# Patient Record
Sex: MALE | Race: WHITE | URBAN - METROPOLITAN AREA
[De-identification: names, ages, dates, MRNs, and addresses within clinical notes are randomized per-mention and may not be internally consistent; named-entity substitution may affect disease eponyms.]

---

## 2017-12-21 ENCOUNTER — TRANSFERRED RECORDS (OUTPATIENT)
Dept: HEALTH INFORMATION MANAGEMENT | Facility: CLINIC | Age: 47
End: 2017-12-21

## 2018-01-01 ENCOUNTER — MEDICAL CORRESPONDENCE (OUTPATIENT)
Dept: HEALTH INFORMATION MANAGEMENT | Facility: CLINIC | Age: 48
End: 2018-01-01

## 2018-02-12 NOTE — TELEPHONE ENCOUNTER
APPT INFO    Date /Time: 3/5/18 1:10PM   Reason for Appt: trigeminal neuralgia    Ref Provider/Clinic: Self referred listed   Are there internal records? Yes/No?  IF YES, list clinic names: NO   Are there outside records? Yes/No? YES - see below   Patient Contact (Y/N) & Call Details: NO - referral received   Action: Reviewed records      OUTSIDE RECORDS CHECKLIST     CLINIC NAME COMMENTS REC (x) IMG (x)   [facility] Fax #: [phone] x x           Records Received From:  [facility]     DATE/EXAM/LOCATION  (specify location if different)   Office Notes: 12/21/17, 11/27/17, 9/21/17, 8/28/17, 6/26/17   Labs: 2017   Radiology Reports: - CT sinuses 9/3/15  - CT head 2/24/16

## 2018-02-21 NOTE — TELEPHONE ENCOUNTER
Received Imaging From: [facility]     Image Type (x): Disc:___  Pacs:__x_      Exam Date/Name: - CT sinuses 9/3/15  - CT head 2/24/16 Comments: images are in PACS

## 2018-03-05 ENCOUNTER — PRE VISIT (OUTPATIENT)
Dept: ANESTHESIOLOGY | Facility: CLINIC | Age: 48
End: 2018-03-05

## 2018-03-12 ENCOUNTER — OFFICE VISIT (OUTPATIENT)
Dept: ANESTHESIOLOGY | Facility: CLINIC | Age: 48
End: 2018-03-12
Payer: COMMERCIAL

## 2018-03-12 VITALS
BODY MASS INDEX: 40.43 KG/M2 | HEART RATE: 86 BPM | WEIGHT: 315 LBS | DIASTOLIC BLOOD PRESSURE: 96 MMHG | HEIGHT: 74 IN | SYSTOLIC BLOOD PRESSURE: 137 MMHG | RESPIRATION RATE: 16 BRPM

## 2018-03-12 DIAGNOSIS — G50.0 TRIGEMINAL NEURALGIA: Primary | ICD-10-CM

## 2018-03-12 RX ORDER — LISINOPRIL 5 MG/1
5 TABLET ORAL
COMMUNITY
Start: 2017-05-12 | End: 2018-05-12

## 2018-03-12 RX ORDER — METOCLOPRAMIDE 10 MG/1
10 TABLET ORAL
COMMUNITY
Start: 2015-09-25

## 2018-03-12 RX ORDER — TIZANIDINE 2 MG/1
2 TABLET ORAL
COMMUNITY
Start: 2016-06-01

## 2018-03-12 RX ORDER — HYDROMORPHONE HYDROCHLORIDE 2 MG/1
2 TABLET ORAL
COMMUNITY
Start: 2016-06-01

## 2018-03-12 RX ORDER — LANOLIN ALCOHOL/MO/W.PET/CERES
1000 CREAM (GRAM) TOPICAL
COMMUNITY
Start: 2015-10-13

## 2018-03-12 RX ORDER — VERAPAMIL HYDROCHLORIDE 240 MG/1
CAPSULE, EXTENDED RELEASE ORAL
COMMUNITY
Start: 2016-10-17

## 2018-03-12 ASSESSMENT — ENCOUNTER SYMPTOMS
NUMBNESS: 1
DECREASED APPETITE: 0
WEIGHT GAIN: 1
ALTERED TEMPERATURE REGULATION: 0
SEIZURES: 0
WEIGHT LOSS: 0
CHILLS: 0
TINGLING: 1
HALLUCINATIONS: 0
POLYDIPSIA: 0
NERVOUS/ANXIOUS: 1
HEADACHES: 0
WEAKNESS: 0
INCREASED ENERGY: 0
MEMORY LOSS: 1
DECREASED CONCENTRATION: 1
DEPRESSION: 0
POLYPHAGIA: 0
DISTURBANCES IN COORDINATION: 0
PARALYSIS: 0
PANIC: 1
FEVER: 0
INSOMNIA: 1
LOSS OF CONSCIOUSNESS: 0
TREMORS: 0
FATIGUE: 0
NIGHT SWEATS: 0
DIZZINESS: 0
SPEECH CHANGE: 0

## 2018-03-12 ASSESSMENT — ANXIETY QUESTIONNAIRES
4. TROUBLE RELAXING: NEARLY EVERY DAY
7. FEELING AFRAID AS IF SOMETHING AWFUL MIGHT HAPPEN: SEVERAL DAYS
3. WORRYING TOO MUCH ABOUT DIFFERENT THINGS: MORE THAN HALF THE DAYS
5. BEING SO RESTLESS THAT IT IS HARD TO SIT STILL: SEVERAL DAYS
GAD7 TOTAL SCORE: 10
7. FEELING AFRAID AS IF SOMETHING AWFUL MIGHT HAPPEN: SEVERAL DAYS
GAD7 TOTAL SCORE: 10
2. NOT BEING ABLE TO STOP OR CONTROL WORRYING: SEVERAL DAYS
GAD7 TOTAL SCORE: 10
6. BECOMING EASILY ANNOYED OR IRRITABLE: SEVERAL DAYS
1. FEELING NERVOUS, ANXIOUS, OR ON EDGE: SEVERAL DAYS

## 2018-03-12 ASSESSMENT — PAIN SCALES - GENERAL: PAINLEVEL: SEVERE PAIN (6)

## 2018-03-12 ASSESSMENT — PATIENT HEALTH QUESTIONNAIRE - PHQ9
10. IF YOU CHECKED OFF ANY PROBLEMS, HOW DIFFICULT HAVE THESE PROBLEMS MADE IT FOR YOU TO DO YOUR WORK, TAKE CARE OF THINGS AT HOME, OR GET ALONG WITH OTHER PEOPLE: EXTREMELY DIFFICULT
SUM OF ALL RESPONSES TO PHQ QUESTIONS 1-9: 11
SUM OF ALL RESPONSES TO PHQ QUESTIONS 1-9: 11

## 2018-03-12 NOTE — LETTER
"3/12/2018       RE: Kang Villeda  [address] [place] [address]     Dear Colleague,    Thank you for referring your patient, Kang Villeda, to the [facility] FOR COMPRE[facility]ENS[facility]NAGEMENT at [facility]. Please see a copy of my visit note below.    I had the pleasure of meeting Mr. Kang Villeda on 3/12/2018 in the outpatient pain clinic in consult for Marly Frausto NP with regards to his trigeminal neuralgia.  HPI:  Patient presents with past medical history of trigeminal neuralgia; he is here today for evaluation of chronic pain of left orbital area, secondary to trigeminal neuralgia. Patient reports onset of symptoms on November 6, 2013 without inciting event.  He states he walked outside, as he was leaving for work, and felt sensation of \"shards of ice on his face\".  These symptoms were persistent until patient underwent two gamma knife surgeries that did eventually resolve his jaw pain.  Prior to gamma knife surgeries, he underwent trigeminal nerve blocks that were not effective.  His only persistent symptom is now his left orbital pain which he describes as \"a brain freeze\"-sensation of pain.  Reportedly, patient has seen several specialists for this remaining neuralgia and all have been unwilling to perform an intervention due to fear of causing blindness-per patient.  Patient was referred here today from his neurology department in [facility] where he was receiving hydromorphone infusions at the infusion center under standing orders.  He admits today that he was likely abusing this intervention, stating he would attend infusion center daily on average.  He was referred here today specifically for consideration of ketamine infusions, which patient states he has obtained insurance approval for.     Of note, patient's current medication regimen for pain consists of hydromorphone 4 mg, 6 tablets/day, tizanidine 2 mg, verapamil 240 mg twice " daily, Reglan 10 mg.  ?  History reviewed. No pertinent past medical history. past medical history reviewed with patient.   History reviewed. No pertinent surgical history. past surgical history reviewed with patient.   Medications:  Current Outpatient Prescriptions   Medication     metoclopramide (REGLAN) 10 MG tablet     tiZANidine (ZANAFLEX) 2 MG tablet     verapamil (VERELAN) 240 MG CP24 24 hr capsule     lisinopril (PRINIVIL/ZESTRIL) 5 MG tablet     HYDROmorphone (DILAUDID) 2 MG tablet     Calcium Carb-Cholecalciferol (OYSTER SHELL CALCIUM) 500-400 MG-UNIT TABS     Cholecalciferol (VITAMIN D3) 1000 UNITS CAPS     Ferrous Sulfate (IRON) 28 MG TABS     cyanocobalamin (VITAMIN  B-12) 1000 MCG tablet     No current facility-administered medications for this visit.      A multi-state Prescription Monitoring Program reviewed and no aberrancies noted.     Allergies:     Allergies   Allergen Reactions     Clindamycin Anaphylaxis and Hives     Other reaction(s): TACHYCARDIA     Doxycycline Hives and Rash     Other reaction(s): TACHYCARDIA     Gabapentin      Other reaction(s): DELIRIUM, TACHYCARDIA     Hydrocodone-Acetaminophen      Other reaction(s): HEADACHE     Nitrofurantoin Hives     Other reaction(s): OTHER     Ondansetron      Other reaction(s): DIZZINESS  Other reaction(s): EDEMA     Pregabalin      Other reaction(s): DELIRIUM, HALLUCINATIONS     Sumatriptan      Other reaction(s): RESPIRATORY DISTRESS     Amoxicillin Hives and Other (See Comments)     Haloperidol Other (See Comments)     Dystonic reaction     Penicillins      Other reaction(s): UNKNOWN  childhood     Phenytoin      Other reaction(s): Other  Patient reported allergy to PHEYTOIN, given in ER.   Ears ringing, dizziness, head pressure.     Family History:  family history is not on file.  Social history: he lives in [place]. he is currently working and is on disability.   Smoking: never smoker. Alcohol: denies. Street drugs: denies.  "    ROS:  A 14 point review of systems was completed; results are listed at end of note.     Objective:   BP (!) 137/96  Pulse 86  Resp 16  Ht 1.88 m (6' 2\")  Wt (!) 195 kg (430 lb)  BMI 55.21 kg/m2  Body mass index is 55.21 kg/(m^2).  General: In no apparent distress, obese  Mental status: Depressed mood, Normal affect, pleasant  Neuro: Alert, oriented. No sensory deficits.   Head: Atraumatic, normocephalic  Eyes: Extra-ocular movements grossly intact, no scleral icterus. No tenderness of frontal lobe; mild allodynia of left lid.   Neck: Supple, Range of motion full  Respiratory: Non-labored breathing; No respiratory distress  Skin: No rashes or lesions noted on exposed areas of skin    Assessment:  Mr. Kang Villeda is a 47 yo male seen today for trigeminal neuralgia with persistent left T1 neuralgia.     Plan:   1. Patient education: I went over the above diagnoses and treatment plan with him and answered all of his questions.  2. Interventions:  -Discussed with patient that a left supraorbital nerve block vs V1 block may provide more targeted treatment for his pain; I will consult his case with my colleagues to determine if this is a reasonable intervention to pursue. We may also consider referral to our cranio-facial pain specialist. Otherwise, ketamine infusions may benefit patient's pain that is secondary to trigeminal neuralgia and is reasonable to consider if analgesia cannot be obtained with block. Will call patient within the week for plan of care.    3. Follow up: Return in 4-6 weeks or as needed.     Total time spent was 30 minutes, and more than 50% of face to face time was spent in counseling and/or coordination of care regarding the above plan.    Thank you for the consult.   ARLETTE Dorado, [facility]  Department of Anesthesiology  "

## 2018-03-12 NOTE — PROGRESS NOTES
"I had the pleasure of meeting Mr. Kang Villeda on 3/12/2018 in the outpatient pain clinic in consult for Marly Frausto NP with regards to his trigeminal neuralgia.  HPI:  Patient presents with past medical history of trigeminal neuralgia; he is here today for evaluation of chronic pain of left orbital area, secondary to trigeminal neuralgia. Patient reports onset of symptoms on November 6, 2013 without inciting event.  He states he walked outside, as he was leaving for work, and felt sensation of \"shards of ice on his face\".  These symptoms were persistent until patient underwent two gamma knife surgeries that did eventually resolve his jaw pain.  Prior to gamma knife surgeries, he underwent trigeminal nerve blocks that were not effective.  His only persistent symptom is now his left orbital pain which he describes as \"a brain freeze\"-sensation of pain.  Reportedly, patient has seen several specialists for this remaining neuralgia and all have been unwilling to perform an intervention due to fear of causing blindness-per patient.  Patient was referred here today from his neurology department in [facility] where he was receiving hydromorphone infusions at the infusion center under standing orders.  He admits today that he was likely abusing this intervention, stating he would attend infusion center daily on average.  He was referred here today specifically for consideration of ketamine infusions, which patient states he has obtained insurance approval for.     Of note, patient's current medication regimen for pain consists of hydromorphone 4 mg, 6 tablets/day, tizanidine 2 mg, verapamil 240 mg twice daily, Reglan 10 mg.  ?  History reviewed. No pertinent past medical history. past medical history reviewed with patient.   History reviewed. No pertinent surgical history. past surgical history reviewed with patient.   Medications:  Current Outpatient Prescriptions   Medication     metoclopramide (REGLAN) 10 " "MG tablet     tiZANidine (ZANAFLEX) 2 MG tablet     verapamil (VERELAN) 240 MG CP24 24 hr capsule     lisinopril (PRINIVIL/ZESTRIL) 5 MG tablet     HYDROmorphone (DILAUDID) 2 MG tablet     Calcium Carb-Cholecalciferol (OYSTER SHELL CALCIUM) 500-400 MG-UNIT TABS     Cholecalciferol (VITAMIN D3) 1000 UNITS CAPS     Ferrous Sulfate (IRON) 28 MG TABS     cyanocobalamin (VITAMIN  B-12) 1000 MCG tablet     No current facility-administered medications for this visit.      A multi-state Prescription Monitoring Program reviewed and no aberrancies noted.     Allergies:     Allergies   Allergen Reactions     Clindamycin Anaphylaxis and Hives     Other reaction(s): TACHYCARDIA     Doxycycline Hives and Rash     Other reaction(s): TACHYCARDIA     Gabapentin      Other reaction(s): DELIRIUM, TACHYCARDIA     Hydrocodone-Acetaminophen      Other reaction(s): HEADACHE     Nitrofurantoin Hives     Other reaction(s): OTHER     Ondansetron      Other reaction(s): DIZZINESS  Other reaction(s): EDEMA     Pregabalin      Other reaction(s): DELIRIUM, HALLUCINATIONS     Sumatriptan      Other reaction(s): RESPIRATORY DISTRESS     Amoxicillin Hives and Other (See Comments)     Haloperidol Other (See Comments)     Dystonic reaction     Penicillins      Other reaction(s): UNKNOWN  childhood     Phenytoin      Other reaction(s): Other  Patient reported allergy to PHEYTOIN, given in ER.   Ears ringing, dizziness, head pressure.     Family History:  family history is not on file.  Social history: he lives in [place]. he is currently working and is on disability.   Smoking: never smoker. Alcohol: denies. Street drugs: denies.     ROS:  A 14 point review of systems was completed; results are listed at end of note.     Objective:   BP (!) 137/96  Pulse 86  Resp 16  Ht 1.88 m (6' 2\")  Wt (!) 195 kg (430 lb)  BMI 55.21 kg/m2  Body mass index is 55.21 kg/(m^2).  General: In no apparent distress, obese  Mental status: Depressed mood, " Normal affect, pleasant  Neuro: Alert, oriented. No sensory deficits.   Head: Atraumatic, normocephalic  Eyes: Extra-ocular movements grossly intact, no scleral icterus. No tenderness of frontal lobe; mild allodynia of left lid.   Neck: Supple, Range of motion full  Respiratory: Non-labored breathing; No respiratory distress  Skin: No rashes or lesions noted on exposed areas of skin        Assessment:  Mr. Kang Villeda is a 47 yo male seen today for trigeminal neuralgia with persistent left T1 neuralgia.     Plan:   1. Patient education: I went over the above diagnoses and treatment plan with him and answered all of his questions.  2. Interventions:  -Discussed with patient that a left supraorbital nerve block vs V1 block may provide more targeted treatment for his pain; I will consult his case with my colleagues to determine if this is a reasonable intervention to pursue. We may also consider referral to our cranio-facial pain specialist. Otherwise, ketamine infusions may benefit patient's pain that is secondary to trigeminal neuralgia and is reasonable to consider if analgesia cannot be obtained with block. Will call patient within the week for plan of care.    3. Follow up: Return in 4-6 weeks or as needed.     Total time spent was 30 minutes, and more than 50% of face to face time was spent in counseling and/or coordination of care regarding the above plan.    Thank you for the consult.   ARLETTE Dorado, [facility]  Department of Anesthesiology           Answers for HPI/ROS submitted by the patient on 3/12/2018   General Symptoms: Yes  Skin Symptoms: No  HENT Symptoms: No  EYE SYMPTOMS: No  HEART SYMPTOMS: No  LUNG SYMPTOMS: No  INTESTINAL SYMPTOMS: No  URINARY SYMPTOMS: No  REPRODUCTIVE SYMPTOMS: No  SKELETAL SYMPTOMS: No  BLOOD SYMPTOMS: No  NERVOUS SYSTEM SYMPTOMS: Yes  MENTAL HEALTH SYMPTOMS: Yes  Fever: No  Loss of appetite: No  Weight loss: No  Weight gain:  Yes  Fatigue: No  Night sweats: No  Chills: No  Increased stress: No  Excessive hunger: No  Excessive thirst: No  Feeling hot or cold when others believe the temperature is normal: No  Loss of height: No  Post-operative complications: No  Surgical site pain: No  Hallucinations: No  Change in or Loss of Energy: No  Hyperactivity: No  Confusion: No  Trouble with coordination: No  Dizziness or trouble with balance: No  Fainting or black-out spells: No  Memory loss: Yes  Headache: No  Seizures: No  Speech problems: No  Tingling: Yes  Tremor: No  Weakness: No  Difficulty walking: No  Paralysis: No  Numbness: Yes  Nervous or Anxious: Yes  Depression: No  Trouble sleeping: Yes  Trouble thinking or concentrating: Yes  Mood changes: No  Panic attacks: Yes  EDITH 7 TOTAL SCORE: 10  PHQ-2 Score: 3  If you checked off any problems, how difficult have these problems made it for you to do your work, take care of things at home, or get along with other people?: Extremely difficult  PHQ9 TOTAL SCORE: 11

## 2018-03-12 NOTE — MR AVS SNAPSHOT
"              After Visit Summary   3/12/2018    Kang Villeda    MRN: [id number]           Patient Information     Date Of Birth          1970        Visit Information        Provider Department      3/12/2018 12:30 PM Ximena Gonzalez APRN [facility]        Care Instructions    1. We will call you to schedule a supraorbital nerve block.      2. We will have your insurance checked for ketamine infusions.          To speak with a nurse, schedule/reschedule/cancel a clinic appointment, or request a medication refill call: [phone]     You can also reach us by [email or web address]: [email or web address]    For refills, please call on Monday, 1 week before your medication runs out. The doctors are not always in clinic, so this gives us time to get your prescriptions ready.  Please let us know the name of the medication you are requesting a refill of.                                     Follow-ups after your visit        Who to contact     Please call your clinic at [phone] to:    Ask questions about your health    Make or cancel appointments    Discuss your medicines    Learn about your test results    Speak to your doctor            Additional Information About Your Visit        Care EveryWhere ID     This is your Care EveryWhere ID. This could be used by other organizations to access your [place] medical records  [id number]        Your Vitals Were     Pulse Respirations Height BMI (Body Mass Index)          86 16 1.88 m (6' 2\") 55.21 kg/m2         Blood Pressure from Last 3 Encounters:   03/12/18 (!) 137/96    Weight from Last 3 Encounters:   03/12/18 (!) 195 kg (430 lb)              Today, you had the following     No orders found for display       Primary Care Provider Office Phone # Fax #    Marcin Durant [phone] [address] [facility] [address]        Equal Access to Services     ARNOLD TITUS AH: Sondra rees " Joe, wajoseda lumarinaadaha, qarosanneta kashannon mulligan, michelle roneyin hayaan cassysaw uvaldobreann laTualeksandr renny. So [facility] [phone].    ATENCIÓN: Si julianne de león, tiene a davies disposición servicios gratuitos de asistencia lingüística. Mae al [phone].    We comply with applicable federal civil rights laws and [place] laws. We do not discriminate on the basis of race, color, national origin, age, disability, sex, sexual orientation, or gender identity.            Thank you!     Thank you for choosing [facility] COM[facility]GEMENT  for your care. Our goal is always to provide you with excellent care. Hearing back from our patients is one way we can continue to improve our services. Please take a few minutes to complete the written survey that you may receive in the mail after your visit with us. Thank you!             Your Updated Medication List - Protect others around you: Learn how to safely use, store and throw away your medicines at www.disposemymeds.org.          This list is accurate as of 3/12/18  1:59 PM.  Always use your most recent med list.                   Brand Name Dispense Instructions for use Diagnosis    cyanocobalamin 1000 MCG tablet    vitamin  B-12     Take 1,000 mcg by mouth        HYDROmorphone 2 MG tablet    DILAUDID     Take 2 mg by mouth        Iron 28 MG Tabs      Take 28 mg by mouth        lisinopril 5 MG tablet    PRINIVIL/ZESTRIL     Take 5 mg by mouth        metoclopramide 10 MG tablet    REGLAN     Take 10 mg by mouth        Oyster Shell Calcium 500-400 MG-UNIT Tabs           tiZANidine 2 MG tablet    ZANAFLEX     Take 2 mg by mouth        verapamil 240 MG Cp24 24 hr capsule    VERELAN     TAKE ONE CAPSULE BY MOUTH TWICE A DAY        vitamin D3 1000 UNITS Caps

## 2018-03-12 NOTE — PATIENT INSTRUCTIONS
1. We will call you to schedule a supraorbital nerve block.      2. We will have your insurance checked for ketamine infusions.          To speak with a nurse, schedule/reschedule/cancel a clinic appointment, or request a medication refill call: [phone]     You can also reach us by [employer]: [email or web address]    For refills, please call on Monday, 1 week before your medication runs out. The doctors are not always in clinic, so this gives us time to get your prescriptions ready.  Please let us know the name of the medication you are requesting a refill of.

## 2018-03-13 ENCOUNTER — CARE COORDINATION (OUTPATIENT)
Dept: ANESTHESIOLOGY | Facility: CLINIC | Age: 48
End: 2018-03-13

## 2018-03-13 ASSESSMENT — PATIENT HEALTH QUESTIONNAIRE - PHQ9: SUM OF ALL RESPONSES TO PHQ QUESTIONS 1-9: 11

## 2018-03-13 ASSESSMENT — ANXIETY QUESTIONNAIRES: GAD7 TOTAL SCORE: 10

## 2018-03-16 ENCOUNTER — TELEPHONE (OUTPATIENT)
Dept: ANESTHESIOLOGY | Facility: CLINIC | Age: 48
End: 2018-03-16

## 2018-03-19 ENCOUNTER — TELEPHONE (OUTPATIENT)
Dept: ANESTHESIOLOGY | Facility: CLINIC | Age: 48
End: 2018-03-19

## 2018-03-19 NOTE — TELEPHONE ENCOUNTER
VM left for pt asking for them to call back when available.   LPN will attempt to reach pt again before the end of the work day.     Domonique Wells LPN

## 2018-03-19 NOTE — TELEPHONE ENCOUNTER
"----- Message from Sea Cooper RN sent at 3/19/2018  8:14 AM CDT -----  Regarding: RE: Pt wants to schedule their Ketamine Infusions  We are awaiting approval. I have not heard back from Renée yet.    He brought in papers that say he is approved for  \"possible\" lidocaine or ketamine infusion, but [place] infusion will still check his insurance so he just has to wait till they've checked it and give him the okay to schedule. This one is out of our hands, they won't schedule unless he has approval.    Can you relay that to him?    Thanks, Sea    ----- Message -----     From: Domonique Wells LPN     Sent: 3/16/2018   4:07 PM       To: Sea Cooper RN  Subject: Pt wants to schedule their Ketamine Infusions    Lisha Osei,   This patient called wanting to schedule Ketamine Infusions. I had told him that I had not heard that he was approved yet, and he got upset because he said that he had paperwork that he submitted to us at his appointment saying that he was.     I told him that I would follow up with you and then get back to him Monday. Any information that you can get back to me, let me know.     -J    "

## 2018-03-19 NOTE — TELEPHONE ENCOUNTER
LPN called and updated pt to inform them that our clinic is waiting to hear from Prior Authorizations regarding scheduling the pt for Ketamine infusions.   Pt was informed that they will be contacted, once there is more information.     Domonique Wells LPN

## 2018-03-22 DIAGNOSIS — G50.0 TRIGEMINAL NEURALGIA: Primary | ICD-10-CM

## 2018-04-02 ENCOUNTER — TELEPHONE (OUTPATIENT)
Dept: NEUROSURGERY | Facility: CLINIC | Age: 48
End: 2018-04-02

## 2018-04-02 NOTE — TELEPHONE ENCOUNTER
Mr. Villeda called looking to scheduled his infusion. He was informed that the authorizations were complete and he has been trying to get his appointment scheduled. In the course of reaching out again, he was transferred to neurosurgery. We attempted to reach the Pain Clinic, but the hold time was too long and Mr. Villeda had to disconnect to assist a family member. I sent an email to the Pain Clinic team to alert them of the situation and let Mr. Villeda know I would follow up with him about what I learned.    Akiko Fan RN, PhD  Neurosurgery

## 2018-04-09 ENCOUNTER — TELEPHONE (OUTPATIENT)
Dept: ANESTHESIOLOGY | Facility: CLINIC | Age: 48
End: 2018-04-09

## 2018-04-09 NOTE — TELEPHONE ENCOUNTER
LPN was directed to call pt and inform them of the following.             Pt verbalized understanding, and was asked to contact the clinic if they had other questions.     Domonique Wells LPN

## 2022-05-27 NOTE — PROGRESS NOTES
I called Kang to update him regarding his plan of care:    1. You may schedule a clinic visit for a supraorbital nerve block  2. Our clinic can refer you to see a neurosurgeon to discuss you facial pain  3. We can pursue ketamine infusions if you're still interested    I also communicated to him that as of April 1st, his insurance will no longer be accepted there.     Kang states he is no interested in option 1 as the nerve block might not help. He is not interested in option 2 at the moment. He would like to pursue ketamine infusions if possible.  
Statement Selected